# Patient Record
Sex: MALE | Race: WHITE | NOT HISPANIC OR LATINO | Employment: UNEMPLOYED | URBAN - METROPOLITAN AREA
[De-identification: names, ages, dates, MRNs, and addresses within clinical notes are randomized per-mention and may not be internally consistent; named-entity substitution may affect disease eponyms.]

---
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
## 2022-01-11 ENCOUNTER — WALK IN (OUTPATIENT)
Dept: URGENT CARE | Age: 22
End: 2022-01-11

## 2022-01-11 VITALS
HEART RATE: 76 BPM | DIASTOLIC BLOOD PRESSURE: 66 MMHG | RESPIRATION RATE: 18 BRPM | TEMPERATURE: 98.3 F | SYSTOLIC BLOOD PRESSURE: 110 MMHG | OXYGEN SATURATION: 96 %

## 2022-01-11 DIAGNOSIS — R68.89 INFLUENZA-LIKE SYMPTOMS: ICD-10-CM

## 2022-01-11 DIAGNOSIS — Z20.822 SUSPECTED COVID-19 VIRUS INFECTION: Primary | ICD-10-CM

## 2022-01-11 LAB
FLUAV AG UPPER RESP QL IA.RAPID: NEGATIVE
FLUBV AG UPPER RESP QL IA.RAPID: NEGATIVE
SARS-COV+SARS-COV-2 AG RESP QL IA.RAPID: DETECTED

## 2022-01-11 PROCEDURE — 87804 INFLUENZA ASSAY W/OPTIC: CPT | Performed by: NURSE PRACTITIONER

## 2022-01-11 PROCEDURE — 87426 SARSCOV CORONAVIRUS AG IA: CPT | Performed by: NURSE PRACTITIONER

## 2022-01-11 PROCEDURE — 99213 OFFICE O/P EST LOW 20 MIN: CPT | Performed by: NURSE PRACTITIONER

## 2022-11-27 ENCOUNTER — OFFICE VISIT (OUTPATIENT)
Dept: URGENT CARE | Facility: URGENT CARE | Age: 22
End: 2022-11-27
Payer: COMMERCIAL

## 2022-11-27 VITALS
TEMPERATURE: 97.9 F | HEART RATE: 58 BPM | OXYGEN SATURATION: 98 % | HEIGHT: 61 IN | BODY MASS INDEX: 36.82 KG/M2 | RESPIRATION RATE: 15 BRPM | DIASTOLIC BLOOD PRESSURE: 70 MMHG | WEIGHT: 195 LBS | SYSTOLIC BLOOD PRESSURE: 110 MMHG

## 2022-11-27 DIAGNOSIS — S91.331A PENETRATING FOOT WOUND, RIGHT, INITIAL ENCOUNTER: Primary | ICD-10-CM

## 2022-11-27 PROCEDURE — 99203 OFFICE O/P NEW LOW 30 MIN: CPT | Performed by: INTERNAL MEDICINE

## 2022-11-27 RX ORDER — LEVOFLOXACIN 500 MG/1
500 TABLET, FILM COATED ORAL DAILY
Qty: 3 TABLET | Refills: 0 | Status: SHIPPED | OUTPATIENT
Start: 2022-11-27

## 2022-11-28 NOTE — PATIENT INSTRUCTIONS
Tetanus up-to-date.  Prophylactic antibiotics as went through tennis shoe.  Covers pseudomonas bacteria.  Levaquin 500 mg for 3 days.    Avoid strenuous activities while foot heals    Watch for signs of swelling.

## 2022-11-28 NOTE — PROGRESS NOTES
"ASSESSMENT AND PLAN:      ICD-10-CM    1. Penetrating foot wound, right, initial encounter  S91.331A levofloxacin (LEVAQUIN) 500 MG tablet          Patient Instructions   Tetanus up-to-date.  Prophylactic antibiotics as went through tennis shoe.  Covers pseudomonas bacteria.  Levaquin 500 mg for 3 days.    Avoid strenuous activities while foot heals    Watch for signs of swelling.          Return if symptoms worsen or fail to improve.        Hoda Bush MD  [facility]    Subjective     Ta Marshall is a 21 year old who presents for Patient presents with:  Urgent Care  Derm Problem: Was walking and cut right foot out of metal sticking out of ground last night.     a new patient of [facility].      Onset of symptoms was 1 day(s) ago.    Location: right foot  Context: stepped on metal , went through tennis shoe  Current and Associated symptoms: pain with walking  Treatment measures tried include: cleaned with soap/water  Relief from treatment: minor  Significant past medical history:     Td/Tdap  07/07/2017  Booster          Objective    /70   Pulse 58   Temp 97.9  F (36.6  C) (Temporal)   Resp 15   Ht 1.549 m (5' 1\")   Wt 88.5 kg (195 lb)   SpO2 98%   BMI 36.84 kg/m    Physical Exam  Vitals reviewed.   Constitutional:       Appearance: Normal appearance.   Musculoskeletal:      Comments: right foot  2-3 mm closed laceration  No discharge or surrounding redness  Tender to touch surrounding area   Neurological:      Mental Status: He is alert.                      "

## 2022-12-04 ENCOUNTER — OFFICE VISIT (OUTPATIENT)
Dept: URGENT CARE | Facility: URGENT CARE | Age: 22
End: 2022-12-04
Payer: COMMERCIAL

## 2022-12-04 VITALS
BODY MASS INDEX: 36.84 KG/M2 | OXYGEN SATURATION: 99 % | WEIGHT: 195 LBS | DIASTOLIC BLOOD PRESSURE: 70 MMHG | HEART RATE: 81 BPM | TEMPERATURE: 98.1 F | SYSTOLIC BLOOD PRESSURE: 128 MMHG

## 2022-12-04 DIAGNOSIS — R07.0 THROAT PAIN: Primary | ICD-10-CM

## 2022-12-04 DIAGNOSIS — J06.9 UPPER RESPIRATORY TRACT INFECTION, UNSPECIFIED TYPE: ICD-10-CM

## 2022-12-04 LAB — DEPRECATED S PYO AG THROAT QL EIA: NEGATIVE

## 2022-12-04 PROCEDURE — 87651 STREP A DNA AMP PROBE: CPT | Performed by: FAMILY MEDICINE

## 2022-12-04 PROCEDURE — 99213 OFFICE O/P EST LOW 20 MIN: CPT | Performed by: FAMILY MEDICINE

## 2022-12-04 NOTE — PATIENT INSTRUCTIONS
# Upper Respiratory Infection: Symptoms noted over the past four days including congestion, clogged ears, sore throat. He did have fevers initially but now improving. Examination showing redness in the posterior pharynx otherwise benign exam. Strep test was negative, culture pending. Right foot appears to be healing well. Given this plan to treat symptomatically is below and follow-up as needed.  Testing Findings: strep test negative, culture pending  Treatment: symptomatically including throat lozenges, cough/cold medications over the counter  JobSchool: as tolerated  Medications:  Limited tylenol/ibuprofen for pain for 1-2 weeks   Follow-up: In 1-2 weeks if symptoms do not improve with primary care provider, sooner if worsening  Can consider repeat evaluation    If you have not yet received the influenza vaccine but would like to get one, please call  [phone] or you can schedule via [employer]    It was great seeing you- today!    Bala Hwang MD, [facility]

## 2022-12-04 NOTE — PROGRESS NOTES
Assessment & Plan     Throat pain     - Streptococcus A Rapid Screen w/Reflex to PCR - Clinic Collect  - Group A Streptococcus PCR Throat Swab    Upper respiratory tract infection, unspecified type        # Upper Respiratory Infection: Symptoms noted over the past four days including congestion, clogged ears, sore throat. He did have fevers initially but now improving. Examination showing redness in the posterior pharynx otherwise benign exam. Strep test was negative, culture pending. Right foot appears to be healing well. Given this plan to treat symptomatically is below and follow-up as needed.  Testing Findings: strep test negative, culture pending  Treatment: symptomatically including throat lozenges, cough/cold medications over the counter  JobSchool: as tolerated  Medications:  Limited tylenol/ibuprofen for pain for 1-2 weeks   Follow-up: In 1-2 weeks if symptoms do not improve with primary care provider, sooner if worsening  Can consider repeat evaluation         Return if symptoms worsen or fail to improve.    Bala Hwang MD  [facility] [place]    Gabrielle Chappell is a 21 year old male who presents to clinic today for the following health issues:  Chief Complaint   Patient presents with     Urgent Care     Pharyngitis     C/O sore throat for 4 days     HPI       URI Adult    Onset of symptoms was 4 day(s) ago.  Course of illness is improving.    Severity mild  Current and Associated symptoms: congestion, clogged ears, fever improved, still has sore throat  Treatment measures tried include Decongestants and dayquil.  Predisposing factors include roommates somewhat sick, not as bad though.  Had right foot puncture wound, didn't take abx, wanted to make sure it looks ok.      Review of Systems  Constitutional, HEENT, cardiovascular, pulmonary, gi and gu systems are negative, except as otherwise noted.      Objective    /70   Pulse 81   Temp 98.1  F (36.7  C)  (Tympanic)   Wt 88.5 kg (195 lb)   SpO2 99%   BMI 36.84 kg/m    Physical Exam  Vitals reviewed.   Constitutional:       General: He is not in acute distress.     Appearance: He is well-developed and well-nourished. He is not diaphoretic.   HENT:      Head: Normocephalic and atraumatic.      Right Ear: External ear normal.      Left Ear: External ear normal.      Nose: Nose normal.      Mouth/Throat:      Mouth: Oropharynx is clear and moist. Mucous membranes are moist.      Pharynx: No oropharyngeal exudate.   Eyes:      Extraocular Movements: EOM normal.      Pupils: Pupils are equal, round, and reactive to light.   Cardiovascular:      Rate and Rhythm: Normal rate and regular rhythm.      Pulses: Intact distal pulses.      Heart sounds: Normal heart sounds. No murmur heard.    No friction rub. No gallop.   Pulmonary:      Effort: Pulmonary effort is normal. No respiratory distress.      Breath sounds: Normal breath sounds. No wheezing or rales.   Abdominal:      General: Bowel sounds are normal. There is no distension.      Palpations: Abdomen is soft.      Tenderness: There is no abdominal tenderness. There is no guarding.   Musculoskeletal:         General: Normal range of motion.      Cervical back: Normal range of motion.        Feet:    Lymphadenopathy:      Cervical: Cervical adenopathy present.   Skin:     General: Skin is warm and dry.      Findings: No rash.   Neurological:      Mental Status: He is alert and oriented to person, place, and time.           Results for orders placed or performed in visit on 12/04/22   Streptococcus A Rapid Screen w/Reflex to PCR - Clinic Collect     Status: Normal    Specimen: Throat; Swab   Result Value Ref Range    Group A Strep antigen Negative Negative

## 2022-12-05 LAB — GROUP A STREP BY PCR: NOT DETECTED

## 2022-12-18 ENCOUNTER — OFFICE VISIT (OUTPATIENT)
Dept: URGENT CARE | Facility: URGENT CARE | Age: 22
End: 2022-12-18
Payer: COMMERCIAL

## 2022-12-18 VITALS
HEART RATE: 72 BPM | SYSTOLIC BLOOD PRESSURE: 118 MMHG | DIASTOLIC BLOOD PRESSURE: 80 MMHG | BODY MASS INDEX: 36.84 KG/M2 | WEIGHT: 195 LBS | TEMPERATURE: 97.2 F | OXYGEN SATURATION: 97 %

## 2022-12-18 DIAGNOSIS — R05.1 ACUTE COUGH: ICD-10-CM

## 2022-12-18 DIAGNOSIS — J06.9 VIRAL URI: Primary | ICD-10-CM

## 2022-12-18 LAB
DEPRECATED S PYO AG THROAT QL EIA: NEGATIVE
FLUAV AG SPEC QL IA: NEGATIVE
FLUBV AG SPEC QL IA: NEGATIVE

## 2022-12-18 PROCEDURE — 87651 STREP A DNA AMP PROBE: CPT | Performed by: PHYSICIAN ASSISTANT

## 2022-12-18 PROCEDURE — 87804 INFLUENZA ASSAY W/OPTIC: CPT | Performed by: PHYSICIAN ASSISTANT

## 2022-12-18 PROCEDURE — 99203 OFFICE O/P NEW LOW 30 MIN: CPT | Performed by: PHYSICIAN ASSISTANT

## 2022-12-18 NOTE — PROGRESS NOTES
Chief Complaint   Patient presents with     Fever     Fever, nose stuffed, sore throat coughing, headache. Pt had covid a month ago.       ASSESSMENT/PLAN:  Ta was seen today for fever.    Diagnoses and all orders for this visit:    Viral URI    Acute cough  -     Streptococcus A Rapid Screen w/Reflex to PCR - Clinic Collect  -     Influenza A & B Antigen - Clinic Collect  -     Group A Streptococcus PCR Throat Swab    Rapid flu, rapid strep negative.  Likely viral URI.  Reassuring exam and vitals.  Supportive care recommended    Caatlino Juarez PA-C      SUBJECTIVE:  Ta is a 21 year old male who presents to urgent care with 3 days of fever, chills, myalgias, nasal congestion, cough and intermittent sore throat.    ROS: Pertinent ROS neg other than the symptoms noted above in the HPI.     OBJECTIVE:  /80   Pulse 72   Temp 97.2  F (36.2  C) (Temporal)   Wt 88.5 kg (195 lb)   SpO2 97%   BMI 36.84 kg/m     GENERAL: healthy, alert and no distress  EYES: Eyes grossly normal to inspection, PERRL and conjunctivae and sclerae normal  HENT: Right tympanic membrane erythematous but no effusion and not bulging, nose and mouth without ulcers or lesions, mild oropharynx erythema, nasal congestion  RESP: lungs clear to auscultation - no rales, rhonchi or wheezes, intermittent cough heard  CV: regular rate and rhythm, normal S1 S2, no S3 or S4, no murmur, click or rub    DIAGNOSTICS    Results for orders placed or performed in visit on 12/18/22   Streptococcus A Rapid Screen w/Reflex to PCR - Clinic Collect     Status: Normal    Specimen: Throat; Swab   Result Value Ref Range    Group A Strep antigen Negative Negative   Influenza A & B Antigen - Clinic Collect     Status: Normal    Specimen: Nasopharyngeal; Swab   Result Value Ref Range    Influenza A antigen Negative Negative    Influenza B antigen Negative Negative    Narrative    Test results must be correlated with clinical data. If necessary, results  should be confirmed by a molecular assay or viral culture.        Current Outpatient Medications   Medication     sertraline (ZOLOFT) 50 MG tablet     levofloxacin (LEVAQUIN) 500 MG tablet     No current facility-administered medications for this visit.      There is no problem list on file for this patient.     No past medical history on file.  No past surgical history on file.  No family history on file.  Social History     Tobacco Use     Smoking status: Never     Smokeless tobacco: Never   Substance Use Topics     Alcohol use: Not on file              The plan of care was discussed with the patient. They understand and agree with the course of treatment prescribed. A printed summary was given including instructions and medications.  The use of Dragon/[employer] dictation services may have been used to construct the content in this note; any grammatical or spelling errors are non-intentional. Please contact the author of this note directly if you are in need of any clarification.

## 2022-12-18 NOTE — PATIENT INSTRUCTIONS
You have a viral upper respiratory infection.  This commonly causes symptoms of your throat, nose, sinuses and bronchi.    You do not need to do anything besides rest and hydrate and your body will get over this.  Sometimes it can take up to 2 weeks to do so.  And the symptoms can be very annoying.    People are commonly contagious for about 3 to 5 days.  Wearing a mask will significantly reduce your risk of transmitting this to someone else if you are within that timeframe.    Some things that she can do to help with her symptoms include:    Pain, malaise and inflammation:  Ibuprofen and Tylenol for pain and inflammation.  I prefer ibuprofen  Ibuprofen 400-600 mg (2-3 of the 200 mg OTC tablets or 400-600 mg of the children's liquid) up to 4 times daily with food or milk  Tylenol 500-1000 mg every 8 hours as needed    For nasal congestion and drainage  Flonase/fluticasone nasal spray 2 sprays in each nostril once a day for 1 - 4 weeks.  This may take several days to become effective.  Consider saline nasal rinses  Psuedofed can help if you tolerate it but do not take if you have high blood pressure     Cough:  Mucinex or guaifenesin can help get mucus out of your body and help with cough.  Dextromethorphan is a cough suppressant that may be helpful  Tessalon Perles may be prescribed    Ear fullness or pain:  Flonase as above  Ibuprofen as above  Otovent, which is a balloon you blow up with your nose and helps pop the ears and regulate the pressure can be bought off of [employer] and works quite well    Supplements that may also be helpful:  Cold snap  Zicam  Zinc    Be sure to eat nutrient dense foods with a good mixture of fats, carbohydrates and proteins.  Your body burns more calories while sick.

## 2022-12-19 LAB — GROUP A STREP BY PCR: NOT DETECTED

## 2023-06-13 ENCOUNTER — OFFICE VISIT (OUTPATIENT)
Dept: URGENT CARE | Facility: URGENT CARE | Age: 23
End: 2023-06-13
Payer: COMMERCIAL

## 2023-06-13 VITALS
DIASTOLIC BLOOD PRESSURE: 72 MMHG | TEMPERATURE: 98.4 F | OXYGEN SATURATION: 99 % | SYSTOLIC BLOOD PRESSURE: 119 MMHG | RESPIRATION RATE: 17 BRPM | HEART RATE: 61 BPM

## 2023-06-13 DIAGNOSIS — S61.214A LACERATION OF RIGHT RING FINGER WITHOUT FOREIGN BODY WITHOUT DAMAGE TO NAIL, INITIAL ENCOUNTER: Primary | ICD-10-CM

## 2023-06-13 PROCEDURE — 99213 OFFICE O/P EST LOW 20 MIN: CPT | Performed by: FAMILY MEDICINE

## 2023-06-13 NOTE — PROGRESS NOTES
SUBJECTIVE:     Chief Complaint   Patient presents with     Urgent Care     Pt is in clinic to have rt 4th finger eval from cut last night          Ta was seen today for urgent care.    Diagnoses and all orders for this visit:    Laceration of right ring finger without foreign body without damage to nail, initial encounter        Assessment:  Laceration of right ring finger without foreign body without damage to nail, initial encounter    PLAN:  PROCEDURE NOTE::    Wound cleaned with HIBICLENS  No suturing needed   After care instructions:  Keep wound clean and dry for the next 24-48 hours  Area was throroghly evaluated for FB no FB noted   Bacitracin was applied and then dressed with Telfa and Coban  Signs of infection discussed today  Apply anti-bacterial ointment for 3 days    Ta Marshall is a 22 year old male who presents to the clinic with a laceration on the right finger sustained 1 day(s) ago.  This is a non-work related and accidental injury.    Mechanism of injury: glass, he got a cut from a broken glass    Associated symptoms: Denies numbness, weakness, or loss of function  Last tetanus booster within 10 years: yes    EXAM:   The patient appears today in alert,no apparent distress distress  VITALS: /72 (BP Location: Right arm, Patient Position: Sitting, Cuff Size: Adult Large)   Pulse 61   Temp 98.4  F (36.9  C) (Temporal)   Resp 17   SpO2 99%     Size of laceration: 0.5 centimeters on the dorsum of the finger over the distal phalanx just below the nail   Characteristics of the laceration: bleeding- mild avulsion kind with skin missing   Tendon function intact: yes  Sensation to light touch intact: yes  Pulses intact: not applicable  Picture included in patient's chart: no      Jaki Norwood MD

## 2023-08-13 ENCOUNTER — HEALTH MAINTENANCE LETTER (OUTPATIENT)
Age: 23
End: 2023-08-13

## 2023-11-09 ENCOUNTER — OFFICE VISIT (OUTPATIENT)
Dept: FAMILY MEDICINE | Facility: CLINIC | Age: 23
End: 2023-11-09
Payer: COMMERCIAL

## 2023-11-09 VITALS
SYSTOLIC BLOOD PRESSURE: 123 MMHG | RESPIRATION RATE: 16 BRPM | HEART RATE: 62 BPM | HEIGHT: 71 IN | DIASTOLIC BLOOD PRESSURE: 75 MMHG | TEMPERATURE: 97.9 F | BODY MASS INDEX: 29.68 KG/M2 | OXYGEN SATURATION: 95 % | WEIGHT: 212 LBS

## 2023-11-09 DIAGNOSIS — M25.561 ACUTE PAIN OF RIGHT KNEE: Primary | ICD-10-CM

## 2023-11-09 DIAGNOSIS — Z23 NEED FOR VACCINATION: ICD-10-CM

## 2023-11-09 PROCEDURE — 90480 ADMN SARSCOV2 VAC 1/ONLY CMP: CPT | Performed by: PHYSICIAN ASSISTANT

## 2023-11-09 PROCEDURE — 99213 OFFICE O/P EST LOW 20 MIN: CPT | Mod: 25 | Performed by: PHYSICIAN ASSISTANT

## 2023-11-09 PROCEDURE — 90471 IMMUNIZATION ADMIN: CPT | Performed by: PHYSICIAN ASSISTANT

## 2023-11-09 PROCEDURE — 91320 SARSCV2 VAC 30MCG TRS-SUC IM: CPT | Performed by: PHYSICIAN ASSISTANT

## 2023-11-09 PROCEDURE — 90686 IIV4 VACC NO PRSV 0.5 ML IM: CPT | Performed by: PHYSICIAN ASSISTANT

## 2023-11-09 ASSESSMENT — PAIN SCALES - GENERAL: PAINLEVEL: MILD PAIN (2)

## 2023-11-09 NOTE — PROGRESS NOTES
"  Assessment & Plan     Acute pain of right knee  Interested in starting with PT  - Physical Therapy Referral; Future    Need for vaccination    - INFLUENZA VACCINE >6 MONTHS (AFLURIA/FLUZONE)  - COVID-19 12+ (2023-24) (PFIZER)             BMI:   Estimated body mass index is 29.57 kg/m  as calculated from the following:    Height as of this encounter: 1.803 m (5' 11\").    Weight as of this encounter: 96.2 kg (212 lb).           Home Haider PA-C  [facility]    Gabrielle Chappell is a 22 year old, presenting for the following health issues:  Knee Pain        11/9/2023     4:20 PM   Additional Questions   Roomed by Debora SALCEDO MA   Accompanied by self         11/9/2023     4:20 PM   Patient Reported Additional Medications   Patient reports taking the following new medications none       History of Present Illness       Reason for visit:  Knee pain  Symptom onset:  More than a month  Symptoms include:  Numbness , pain,restricted movement  Symptom intensity:  Moderate  Symptom progression:  Staying the same  Had these symptoms before:  Yes  Has tried/received treatment for these symptoms:  Yes  Previous treatment was successful:  No  What makes it worse:  Excersise,unusual movement  What makes it better:  Rest    He eats 2-3 servings of fruits and vegetables daily.He consumes 1 sweetened beverage(s) daily.He exercises with enough effort to increase his heart rate 20 to 29 minutes per day.  He exercises with enough effort to increase his heart rate 4 days per week. He is missing 1 dose(s) of medications per week.                 Review of Systems   Constitutional, HEENT, cardiovascular, pulmonary, gi and gu systems are negative, except as otherwise noted.      Objective    /75   Pulse 62   Temp 97.9  F (36.6  C) (Temporal)   Resp 16   Ht 1.803 m (5' 11\")   Wt 96.2 kg (212 lb)   SpO2 95%   BMI 29.57 kg/m    Body mass index is 29.57 kg/m .  Physical Exam   GENERAL: alert and no " distress  EYES: Eyes grossly normal to inspection  RESP: lungs clear to auscultation - no rales, rhonchi or wheezes  CV: regular rate and rhythm, normal S1 S2, no S3 or S4, no murmur, click or rub, no peripheral edema and peripheral pulses strong

## 2024-01-08 ENCOUNTER — THERAPY VISIT (OUTPATIENT)
Dept: PHYSICAL THERAPY | Facility: CLINIC | Age: 24
End: 2024-01-08
Payer: COMMERCIAL

## 2024-01-08 DIAGNOSIS — G89.29 CHRONIC PAIN OF RIGHT KNEE: Primary | ICD-10-CM

## 2024-01-08 DIAGNOSIS — M25.561 CHRONIC PAIN OF RIGHT KNEE: Primary | ICD-10-CM

## 2024-01-08 PROCEDURE — 97530 THERAPEUTIC ACTIVITIES: CPT | Mod: GP

## 2024-01-08 PROCEDURE — 97110 THERAPEUTIC EXERCISES: CPT | Mod: GP

## 2024-01-08 PROCEDURE — 97161 PT EVAL LOW COMPLEX 20 MIN: CPT | Mod: GP

## 2024-01-08 ASSESSMENT — ACTIVITIES OF DAILY LIVING (ADL)
HOW_WOULD_YOU_RATE_THE_OVERALL_FUNCTION_OF_YOUR_KNEE_DURING_YOUR_USUAL_DAILY_ACTIVITIES?: NEARLY NORMAL
GO DOWN STAIRS: ACTIVITY IS MINIMALLY DIFFICULT
WALK: ACTIVITY IS MINIMALLY DIFFICULT
HOW_WOULD_YOU_RATE_THE_CURRENT_FUNCTION_OF_YOUR_KNEE_DURING_YOUR_USUAL_DAILY_ACTIVITIES_ON_A_SCALE_FROM_0_TO_100_WITH_100_BEING_YOUR_LEVEL_OF_KNEE_FUNCTION_PRIOR_TO_YOUR_INJURY_AND_0_BEING_THE_INABILITY_TO_PERFORM_ANY_OF_YOUR_USUAL_DAILY_ACTIVITIES?: 80
WEAKNESS: I HAVE THE SYMPTOM BUT IT DOES NOT AFFECT MY ACTIVITY
PAIN: THE SYMPTOM AFFECTS MY ACTIVITY SLIGHTLY
KNEE_ACTIVITY_OF_DAILY_LIVING_SUM: 53
RISE FROM A CHAIR: ACTIVITY IS SOMEWHAT DIFFICULT
RAW_SCORE: 53
LIMPING: I HAVE THE SYMPTOM BUT IT DOES NOT AFFECT MY ACTIVITY
GO UP STAIRS: ACTIVITY IS MINIMALLY DIFFICULT
SQUAT: ACTIVITY IS SOMEWHAT DIFFICULT
AS_A_RESULT_OF_YOUR_KNEE_INJURY,_HOW_WOULD_YOU_RATE_YOUR_CURRENT_LEVEL_OF_DAILY_ACTIVITY?: NEARLY NORMAL
KNEE_ACTIVITY_OF_DAILY_LIVING_SCORE: 75.71
SIT WITH YOUR KNEE BENT: ACTIVITY IS SOMEWHAT DIFFICULT
SWELLING: I DO NOT HAVE THE SYMPTOM
KNEEL ON THE FRONT OF YOUR KNEE: ACTIVITY IS NOT DIFFICULT
STIFFNESS: THE SYMPTOM AFFECTS MY ACTIVITY SLIGHTLY
STAND: ACTIVITY IS MINIMALLY DIFFICULT
GIVING WAY, BUCKLING OR SHIFTING OF KNEE: I HAVE THE SYMPTOM BUT IT DOES NOT AFFECT MY ACTIVITY

## 2024-01-08 NOTE — PROGRESS NOTES
PHYSICAL THERAPY EVALUATION  Type of Visit: Evaluation    See electronic medical record for Abuse and Falls Screening details.    Ta reports about a year ago he felt a pop in lateral R knee playing soccer. The pain resolved quickly initially, but some symptoms continue to linger. He notes pain any time he tries to increase levels of physical activity (ian w/ running and cutting). Some discomfort w/ weights and squatting. C/o numbness in calf and difficulty dorsiflexing in R ankle w/ running and intense physical activity.  Feels better w/ some stretching.   Exercise: plays tennis and soccer occasionally. Weights and cardio at gym. Not running currently d/t pain. Long term wants to be able to run w/out pain.     Subjective       Presenting condition or subjective complaint: Knee Pain  Date of onset:      Relevant medical history:     Dates & types of surgery: Pinky surgery 06/2018    Prior diagnostic imaging/testing results:       Prior therapy history for the same diagnosis, illness or injury: No      Prior Level of Function  Transfers:   Ambulation:   ADL:   IADL:     Living Environment  Social support: Alone   Type of home: Apartment/condo   Stairs to enter the home: No       Ramp: Yes   Stairs inside the home: No       Help at home: None  Equipment owned:       Employment: Yes   Hobbies/Interests: Soccer, tennis    Patient goals for therapy: Play sports without pain, intense physical activity    Pain assessment: See objective evaluation for additional pain details     Objective   KNEE EVALUATION  PAIN: Pain Location: Lateral R knee, around fibular head  Pain Quality: Aching, Sharp, and tight  Pain is Exacerbated By: squatting/weights, running, sport  Pain is Relieved By: rest and stretch  Pain Progression: Unchanged  INTEGUMENTARY (edema, incisions): WNL  POSTURE:   GAIT:  Weightbearing Status:   Assistive Device(s):   Gait Deviations: WNL  BALANCE/PROPRIOCEPTION: Single Leg Stance Eyes Open (seconds):  "30 seconds B, is harder to complete on R  WEIGHTBEARING ALIGNMENT:   NON-WEIGHTBEARING ALIGNMENT:   ROM: AROM WNL  PROM WNL    STRENGTH:   Pain: - none + mild ++ moderate +++ severe  Strength Scale: 0-5/5 Left Right   Hamstrings 5 4, ++ (mod)   Quads 5 5   Glute max 5 5   Tibialis anterior  4, ++(mod)   Tibialis posterior  4, + (mild)   Peroneals  5   Single-leg calf raises 30 30, some pain and feels \"tight\" afterwards     FLEXIBILITY: Decreased hamstrings R, Decreased gastroc R, Decreased soleus R  SPECIAL TESTS:    Left Right   Apley's (Meniscus) Negative  Negative    Dianna's (Meniscus) Negative  Negative    Kirby's (ITB/TFL)     Patellar Apprehension Test     Patella Tracking  WNL   Ligamentous Stability     Anterior Drawer (ACL)  Negative,     Posterior Drawer (PCL)  Negative,     Prone Dial Test at 30 Deg and 90 Deg (PCL/PLC)     Valgus Stress Testing at 0 Deg and 30 Deg  Negative,     Varus Stress Testing at 0 Deg and 30 Deg   Negative,       FUNCTIONAL TESTS: Double Leg Squat: Good technique/no significant findings  Single Leg Squat: Knee valgus, Hip internal rotation on R side only  PALPATION:   + Tenderness At Location Left Right   Medial Joint Line  -   Lateral Joint Line  -   Patellar Tendon  -   IT Band  -   Fibular head  +   Popliteal     Biceps Femoris  +   Semitendinosis  -   Semimembranosis  -   Glut Medius     Patellar Medial  -   Patellar Lateral  -   Patellar Superior  -   Patellar Inferior   -     JOINT MOBILITY:     Assessment & Plan   CLINICAL IMPRESSIONS  Medical Diagnosis: Acute pain of right knee    Treatment Diagnosis: R knee pain   Impression/Assessment: Patient is a 23 year old male with R knee complaints.  The following significant findings have been identified: Pain, Decreased strength, Impaired balance, Impaired muscle performance, and Decreased activity tolerance. These impairments interfere with their ability to perform recreational activities and community mobility as compared to " previous level of function.     Clinical Decision Making (Complexity):  Clinical Presentation: Stable/Uncomplicated  Clinical Presentation Rationale: based on medical and personal factors listed in PT evaluation  Clinical Decision Making (Complexity): Low complexity    PLAN OF CARE  Treatment Interventions:  Modalities: Biofeedback, Cryotherapy, Dry Needling, E-stim, Hot Pack, Iontophoresis, Mechanical Traction, Ultrasound, Vasoneumatic Device  Interventions: Gait Training, Manual Therapy, Neuromuscular Re-education, Therapeutic Activity, Therapeutic Exercise    Long Term Goals     PT Goal 1  Goal Identifier: Run  Goal Description: Pt will report no pain w/ 30 min run for painfree participation in sport for health maintenance  Rationale: to maximize safety and independence with performance of ADLs and functional tasks;to maximize safety and independence within the community  Target Date: 04/01/24      Frequency of Treatment: every-other wk  Duration of Treatment: 3 mo    Recommended Referrals to Other Professionals: Physical Therapy  Education Assessment:   Learner/Method: Patient;Listening;Demonstration;Pictures/Video;No Barriers to Learning    Risks and benefits of evaluation/treatment have been explained.   Patient/Family/caregiver agrees with Plan of Care.     Evaluation Time:     PT Eval, Low Complexity Minutes (80085): 15       Signing Clinician: CLEVELAND KELLEY PT

## 2024-04-01 NOTE — PROGRESS NOTES
DISCHARGE  Reason for Discharge: Patient has failed to schedule further appointments.    Equipment Issued: none    Discharge Plan: Patient to continue home program.    Referring Provider:  Home Haider

## 2024-08-02 ENCOUNTER — VIRTUAL VISIT (OUTPATIENT)
Dept: FAMILY MEDICINE | Facility: CLINIC | Age: 24
End: 2024-08-02
Payer: COMMERCIAL

## 2024-08-02 DIAGNOSIS — F33.41 RECURRENT MAJOR DEPRESSIVE DISORDER, IN PARTIAL REMISSION (H): Primary | ICD-10-CM

## 2024-08-02 DIAGNOSIS — F42.2 MIXED OBSESSIONAL THOUGHTS AND ACTS: ICD-10-CM

## 2024-08-02 PROCEDURE — 99213 OFFICE O/P EST LOW 20 MIN: CPT | Mod: 95 | Performed by: PHYSICIAN ASSISTANT

## 2024-08-02 ASSESSMENT — PATIENT HEALTH QUESTIONNAIRE - PHQ9
SUM OF ALL RESPONSES TO PHQ QUESTIONS 1-9: 8
10. IF YOU CHECKED OFF ANY PROBLEMS, HOW DIFFICULT HAVE THESE PROBLEMS MADE IT FOR YOU TO DO YOUR WORK, TAKE CARE OF THINGS AT HOME, OR GET ALONG WITH OTHER PEOPLE: SOMEWHAT DIFFICULT
SUM OF ALL RESPONSES TO PHQ QUESTIONS 1-9: 8

## 2024-08-02 ASSESSMENT — ANXIETY QUESTIONNAIRES
6. BECOMING EASILY ANNOYED OR IRRITABLE: SEVERAL DAYS
2. NOT BEING ABLE TO STOP OR CONTROL WORRYING: SEVERAL DAYS
GAD7 TOTAL SCORE: 7
1. FEELING NERVOUS, ANXIOUS, OR ON EDGE: MORE THAN HALF THE DAYS
7. FEELING AFRAID AS IF SOMETHING AWFUL MIGHT HAPPEN: NOT AT ALL
IF YOU CHECKED OFF ANY PROBLEMS ON THIS QUESTIONNAIRE, HOW DIFFICULT HAVE THESE PROBLEMS MADE IT FOR YOU TO DO YOUR WORK, TAKE CARE OF THINGS AT HOME, OR GET ALONG WITH OTHER PEOPLE: SOMEWHAT DIFFICULT
7. FEELING AFRAID AS IF SOMETHING AWFUL MIGHT HAPPEN: NOT AT ALL
4. TROUBLE RELAXING: SEVERAL DAYS
8. IF YOU CHECKED OFF ANY PROBLEMS, HOW DIFFICULT HAVE THESE MADE IT FOR YOU TO DO YOUR WORK, TAKE CARE OF THINGS AT HOME, OR GET ALONG WITH OTHER PEOPLE?: SOMEWHAT DIFFICULT
GAD7 TOTAL SCORE: 7
GAD7 TOTAL SCORE: 7
3. WORRYING TOO MUCH ABOUT DIFFERENT THINGS: SEVERAL DAYS
5. BEING SO RESTLESS THAT IT IS HARD TO SIT STILL: SEVERAL DAYS

## 2024-08-02 NOTE — PROGRESS NOTES
"Ta is a 23 year old who is being evaluated via a billable video visit.    How would you like to obtain your AVS? MyChart  If the video visit is dropped, the invitation should be resent by: Send to e-mail at: [email or web address]  Will anyone else be joining your video visit? No      Assessment & Plan     Recurrent major depressive disorder, in partial remission (H24)  Doing well, will continue  - sertraline (ZOLOFT) 50 MG tablet; Take 1 tablet (50 mg) by mouth daily    Mixed obsessional thoughts and acts    - Adult Mental Health  Referral; Future          BMI  Estimated body mass index is 29.57 kg/m  as calculated from the following:    Height as of 11/9/23: 1.803 m (5' 11\").    Weight as of 11/9/23: 96.2 kg (212 lb).             Subjective   Ta is a 23 year old, presenting for the following health issues:  Refill Request        8/2/2024    12:30 PM   Additional Questions   Roomed by SAM Cervantesrentice   Accompanied by n/a     History of Present Illness       Reason for visit:  To discuss a prescription refill    He eats 0-1 servings of fruits and vegetables daily.He consumes 2 sweetened beverage(s) daily.He exercises with enough effort to increase his heart rate 10 to 19 minutes per day.  He exercises with enough effort to increase his heart rate 3 or less days per week. He is missing 1 dose(s) of medications per week.  He is not taking prescribed medications regularly due to other.               Review of Systems  Constitutional, HEENT, cardiovascular, pulmonary, gi and gu systems are negative, except as otherwise noted.      Objective           Vitals:  No vitals were obtained today due to virtual visit.    Physical Exam   GENERAL: alert and no distress  EYES: Eyes grossly normal to inspection.  No discharge or erythema, or obvious scleral/conjunctival abnormalities.  RESP: No audible wheeze, cough, or visible cyanosis.    SKIN: Visible skin clear. No significant rash, abnormal pigmentation " or lesions.  NEURO: Cranial nerves grossly intact.  Mentation and speech appropriate for age.  PSYCH: Appropriate affect, tone, and pace of words          Video-Visit Details    Type of service:  Video Visit   Originating Location (pt. Location): Home    Distant Location (provider location):  On-site  Platform used for Video Visit: Jacquelin  Signed Electronically by: Home Haider PA-C

## 2024-10-06 ENCOUNTER — HEALTH MAINTENANCE LETTER (OUTPATIENT)
Age: 24
End: 2024-10-06

## 2025-02-26 ENCOUNTER — MYC REFILL (OUTPATIENT)
Dept: FAMILY MEDICINE | Facility: CLINIC | Age: 25
End: 2025-02-26
Payer: COMMERCIAL

## 2025-02-26 DIAGNOSIS — F33.41 RECURRENT MAJOR DEPRESSIVE DISORDER, IN PARTIAL REMISSION: ICD-10-CM

## 2025-03-20 DIAGNOSIS — F33.41 RECURRENT MAJOR DEPRESSIVE DISORDER, IN PARTIAL REMISSION: ICD-10-CM

## 2025-06-19 DIAGNOSIS — F33.41 RECURRENT MAJOR DEPRESSIVE DISORDER, IN PARTIAL REMISSION: ICD-10-CM
